# Patient Record
Sex: FEMALE | Race: WHITE | URBAN - METROPOLITAN AREA
[De-identification: names, ages, dates, MRNs, and addresses within clinical notes are randomized per-mention and may not be internally consistent; named-entity substitution may affect disease eponyms.]

---

## 2024-11-14 ENCOUNTER — NURSING HOME VISIT (OUTPATIENT)
Dept: POST ACUTE CARE | Facility: EXTERNAL LOCATION | Age: 54
End: 2024-11-14

## 2024-11-14 DIAGNOSIS — E03.9 HYPOTHYROIDISM, UNSPECIFIED TYPE: ICD-10-CM

## 2024-11-14 DIAGNOSIS — L85.3 DRY SKIN: ICD-10-CM

## 2024-11-14 DIAGNOSIS — J81.1 CHRONIC PULMONARY EDEMA: ICD-10-CM

## 2024-11-14 DIAGNOSIS — I89.0 LYMPHEDEMA: ICD-10-CM

## 2024-11-14 DIAGNOSIS — L03.115 CELLULITIS OF RIGHT LEG: ICD-10-CM

## 2024-11-14 DIAGNOSIS — B37.9 YEAST INFECTION: ICD-10-CM

## 2024-11-14 DIAGNOSIS — R53.81 PHYSICAL DECONDITIONING: Primary | ICD-10-CM

## 2024-11-14 DIAGNOSIS — E55.9 VITAMIN D DEFICIENCY: ICD-10-CM

## 2024-11-14 DIAGNOSIS — D50.8 IRON DEFICIENCY ANEMIA SECONDARY TO INADEQUATE DIETARY IRON INTAKE: ICD-10-CM

## 2024-11-14 NOTE — LETTER
Patient: Jackie Whalen  : 1970    Encounter Date: 2024    Patient ID: Jackie Whalen is a 54 y.o. female who is acute skilled care being seen and evaluated for multiple medical problems.  [id number]   1970    /68   Pulse 68   Temp 36.6 °C (97.9 °F)   Resp 15   Wt (!) 187 kg (412 lb)   SpO2 96%     Assessment/Plan  Problem List Items Addressed This Visit       Yeast infection     Miconazole 2% powder BID as prescribed           Dry skin     Ammonia Lactate 12% BLE BID         Physical deconditioning - Primary     PT/OT         Hypothyroidism     Levothyroxine 50 mcg by mouth daily   2024 TSH 7.805  Repeat TSH in 6 weeks          Lymphedema     PMH         Cellulitis of right leg     Recent admission   Cefepime completed during Hospitalization   2024 BUN/Creat 13/0.4, Potassium 4.5         Vitamin D deficiency     D2 50,000 units by mouth weekly   2024 D- less than 7         Chronic pulmonary edema     1800 ml fluid restriction          Iron deficiency anemia secondary to inadequate dietary iron intake     PMH   2024 H/H 10/33.2              HPI: Client was admitted at [facility] in [place], [place] from 10/28/2024- 2024 with the final diagnosis of pulmonary edema, BLE lymphedema, and RLE cellulitis. She completed Cefepime. She received IV Lasix. Placed on 1800 ml fluid restriction. Scabies treated with Permethrin x 2 (10/29/2024-2024). PMH includes Lymphedema, HTN, HLD, Obesity, GERD, Iron anemia, hypothyroidism, ANIYAH, and Cellulitis. Client denies HA, dizziness, or lightheadedness. Denies CP, SOB, or increased edema. O2 2 lit NC. Denies abdominal pain, N/V, diarrhea, or constipation. Denies dysuria. Denies change in appetite. She denies current pain.       Review of Systems ROS as described in HPI     Physical Exam  Constitutional:       Comments: Sitting in WC   HENT:      Mouth/Throat:      Mouth: Mucous membranes are moist.   Cardiovascular:      Rate and  Rhythm: Normal rate.   Pulmonary:      Effort: Pulmonary effort is normal.      Comments: 2 lit NC  Abdominal:      General: Bowel sounds are normal.   Genitourinary:     Comments: Denies dysuria   Musculoskeletal:      Comments: PT/OT   BLE lymphedema    Skin:     General: Skin is warm and dry.      Comments: BLE- wound care team services    Neurological:      Mental Status: She is alert. Mental status is at baseline.   Psychiatric:         Mood and Affect: Mood normal.                   Electronically Signed By: PATRICIA Robbins   11/16/24  1:38 PM

## 2024-11-16 VITALS
HEART RATE: 68 BPM | RESPIRATION RATE: 15 BRPM | WEIGHT: 293 LBS | SYSTOLIC BLOOD PRESSURE: 126 MMHG | OXYGEN SATURATION: 96 % | DIASTOLIC BLOOD PRESSURE: 68 MMHG | TEMPERATURE: 97.9 F

## 2024-11-16 PROBLEM — D50.8 IRON DEFICIENCY ANEMIA SECONDARY TO INADEQUATE DIETARY IRON INTAKE: Status: ACTIVE | Noted: 2024-11-16

## 2024-11-16 PROBLEM — I89.0 LYMPHEDEMA: Status: ACTIVE | Noted: 2024-11-16

## 2024-11-16 PROBLEM — E03.9 HYPOTHYROIDISM: Status: ACTIVE | Noted: 2024-11-16

## 2024-11-16 PROBLEM — G47.33 OSA (OBSTRUCTIVE SLEEP APNEA): Status: ACTIVE | Noted: 2024-11-16

## 2024-11-16 PROBLEM — E78.49 OTHER HYPERLIPIDEMIA: Status: ACTIVE | Noted: 2024-11-16

## 2024-11-16 PROBLEM — K21.9 GASTROESOPHAGEAL REFLUX DISEASE WITHOUT ESOPHAGITIS: Status: ACTIVE | Noted: 2024-11-16

## 2024-11-16 PROBLEM — B37.9 YEAST INFECTION: Status: ACTIVE | Noted: 2024-11-16

## 2024-11-16 PROBLEM — E55.9 VITAMIN D DEFICIENCY: Status: ACTIVE | Noted: 2024-11-16

## 2024-11-16 PROBLEM — J81.1 CHRONIC PULMONARY EDEMA: Status: ACTIVE | Noted: 2024-11-16

## 2024-11-16 PROBLEM — R53.81 PHYSICAL DECONDITIONING: Status: ACTIVE | Noted: 2024-11-16

## 2024-11-16 PROBLEM — L85.3 DRY SKIN: Status: ACTIVE | Noted: 2024-11-16

## 2024-11-16 PROBLEM — L03.115 CELLULITIS OF RIGHT LEG: Status: ACTIVE | Noted: 2024-11-16

## 2024-11-16 PROBLEM — I10 PRIMARY HYPERTENSION: Status: ACTIVE | Noted: 2024-11-16

## 2024-11-16 NOTE — ASSESSMENT & PLAN NOTE
Recent admission   Cefepime completed during Hospitalization   11/11/2024 BUN/Creat 13/0.4, Potassium 4.5

## 2024-11-16 NOTE — PROGRESS NOTES
Patient ID: Jackie Whalen is a 54 y.o. female who is acute skilled care being seen and evaluated for multiple medical problems.  [id number]   1970    /68   Pulse 68   Temp 36.6 °C (97.9 °F)   Resp 15   Wt (!) 187 kg (412 lb)   SpO2 96%     Assessment/Plan  Problem List Items Addressed This Visit       Yeast infection     Miconazole 2% powder BID as prescribed           Dry skin     Ammonia Lactate 12% BLE BID         Physical deconditioning - Primary     PT/OT         Hypothyroidism     Levothyroxine 50 mcg by mouth daily   11/11/2024 TSH 7.805  Repeat TSH in 6 weeks          Lymphedema     PMH         Cellulitis of right leg     Recent admission   Cefepime completed during Hospitalization   11/11/2024 BUN/Creat 13/0.4, Potassium 4.5         Vitamin D deficiency     D2 50,000 units by mouth weekly   11/11/2024 D- less than 7         Chronic pulmonary edema     1800 ml fluid restriction          Iron deficiency anemia secondary to inadequate dietary iron intake     PMH   11/11/2024 H/H 10/33.2              HPI: Client was admitted at [facility] in [place], [place] from 10/28/2024- 11/8/2024 with the final diagnosis of pulmonary edema, BLE lymphedema, and RLE cellulitis. She completed Cefepime. She received IV Lasix. Placed on 1800 ml fluid restriction. Scabies treated with Permethrin x 2 (10/29/2024-11/5/2024). PMH includes Lymphedema, HTN, HLD, Obesity, GERD, Iron anemia, hypothyroidism, ANIYAH, and Cellulitis. Client denies HA, dizziness, or lightheadedness. Denies CP, SOB, or increased edema. O2 2 lit NC. Denies abdominal pain, N/V, diarrhea, or constipation. Denies dysuria. Denies change in appetite. She denies current pain.       Review of Systems ROS as described in HPI     Physical Exam  Constitutional:       Comments: Sitting in WC   HENT:      Mouth/Throat:      Mouth: Mucous membranes are moist.   Cardiovascular:      Rate and Rhythm: Normal rate.   Pulmonary:      Effort: Pulmonary effort is  normal.      Comments: 2 lit NC  Abdominal:      General: Bowel sounds are normal.   Genitourinary:     Comments: Denies dysuria   Musculoskeletal:      Comments: PT/OT   BLE lymphedema    Skin:     General: Skin is warm and dry.      Comments: BLE- wound care team services    Neurological:      Mental Status: She is alert. Mental status is at baseline.   Psychiatric:         Mood and Affect: Mood normal.

## 2024-11-22 ENCOUNTER — NURSING HOME VISIT (OUTPATIENT)
Dept: POST ACUTE CARE | Facility: EXTERNAL LOCATION | Age: 54
End: 2024-11-22

## 2024-11-22 DIAGNOSIS — E03.8 OTHER SPECIFIED HYPOTHYROIDISM: Primary | ICD-10-CM

## 2024-11-22 DIAGNOSIS — L85.3 DRY SKIN: ICD-10-CM

## 2024-11-22 DIAGNOSIS — E55.9 VITAMIN D DEFICIENCY: ICD-10-CM

## 2024-11-22 DIAGNOSIS — R53.81 PHYSICAL DECONDITIONING: ICD-10-CM

## 2024-11-22 DIAGNOSIS — I89.0 LYMPHEDEMA: ICD-10-CM

## 2024-11-22 DIAGNOSIS — L03.115 CELLULITIS OF RIGHT LEG: ICD-10-CM

## 2024-11-22 PROCEDURE — 99308 SBSQ NF CARE LOW MDM 20: CPT | Performed by: NURSE PRACTITIONER

## 2024-11-22 NOTE — LETTER
Patient: Jackie Whalen  : 1970    Encounter Date: 2024    Patient ID: Jackie Whalen is a 54 y.o. female who is acute skilled care being seen and evaluated for multiple medical problems.  [id number]   1970    /80   Pulse 78   Temp 36.7 °C (98 °F)   Resp 16   Wt (!) 188 kg (414 lb)   SpO2 96%     Assessment/Plan  Problem List Items Addressed This Visit       Dry skin     Ammonia Lactate 12% BLE BID         Physical deconditioning     PT/OT         Hypothyroidism - Primary     Levothyroxine 50 mcg by mouth daily   2024 TSH 7.805  Repeat TSH in 6 weeks - 2024          Lymphedema     PMH- Wound care as prescribed   Receives Wound care team services          Cellulitis of right leg     Recent Hospital admission   Cefepime completed during Hospitalization   2024 BUN/Creat 13/0.4, Potassium 4.5  2024 BUN/Creat 13/0.4, Potassium 4.5, H/H 10/32.2         Vitamin D deficiency     D2 50,000 units by mouth weekly   2024 D- less than 7  Vitamin D level 2025              HPI: Client denies HA, dizziness, or lightheadedness. Denies CP, SOB,  or increased edema. RA. C/O slight cough. Denies abdominal pain, N/V, diarrhea, or constipation. Denies dysuria.  Denies change in appetite. Denies insomnia. Denies current pain.      Review of Systems ROS as described in HPI     Physical Exam  Constitutional:       Comments: Sitting in WC   HENT:      Mouth/Throat:      Mouth: Mucous membranes are moist.   Cardiovascular:      Rate and Rhythm: Normal rate.   Pulmonary:      Effort: Pulmonary effort is normal.      Breath sounds: Normal breath sounds.      Comments: RA  Abdominal:      General: Bowel sounds are normal.   Genitourinary:     Comments: Denies dysuria   Musculoskeletal:      Comments: BLE Lymphedema    Skin:     General: Skin is warm and dry.      Comments: BLE Dressings D/I    Neurological:      Mental Status: She is alert. Mental status is at baseline.    Psychiatric:         Mood and Affect: Mood normal.      Comments: Cooperative with assessment                    Electronically Signed By: PATRICIA Robbins   11/23/24 11:03 AM

## 2024-11-23 VITALS
DIASTOLIC BLOOD PRESSURE: 80 MMHG | HEART RATE: 78 BPM | RESPIRATION RATE: 16 BRPM | OXYGEN SATURATION: 96 % | WEIGHT: 293 LBS | SYSTOLIC BLOOD PRESSURE: 127 MMHG | TEMPERATURE: 98 F

## 2024-11-23 NOTE — PROGRESS NOTES
Patient ID: Jackie Whalen is a 54 y.o. female who is acute skilled care being seen and evaluated for multiple medical problems.  [id number]   1970    /80   Pulse 78   Temp 36.7 °C (98 °F)   Resp 16   Wt (!) 188 kg (414 lb)   SpO2 96%     Assessment/Plan  Problem List Items Addressed This Visit       Dry skin     Ammonia Lactate 12% BLE BID         Physical deconditioning     PT/OT         Hypothyroidism - Primary     Levothyroxine 50 mcg by mouth daily   11/11/2024 TSH 7.805  Repeat TSH in 6 weeks - 12/27/2024          Lymphedema     PMH- Wound care as prescribed   Receives Wound care team services          Cellulitis of right leg     Recent Hospital admission   Cefepime completed during Hospitalization   11/11/2024 BUN/Creat 13/0.4, Potassium 4.5  11/13/2024 BUN/Creat 13/0.4, Potassium 4.5, H/H 10/32.2         Vitamin D deficiency     D2 50,000 units by mouth weekly   11/11/2024 D- less than 7  Vitamin D level 2/14/2025              HPI: Client denies HA, dizziness, or lightheadedness. Denies CP, SOB,  or increased edema. RA. C/O slight cough. Denies abdominal pain, N/V, diarrhea, or constipation. Denies dysuria.  Denies change in appetite. Denies insomnia. Denies current pain.      Review of Systems ROS as described in HPI     Physical Exam  Constitutional:       Comments: Sitting in WC   HENT:      Mouth/Throat:      Mouth: Mucous membranes are moist.   Cardiovascular:      Rate and Rhythm: Normal rate.   Pulmonary:      Effort: Pulmonary effort is normal.      Breath sounds: Normal breath sounds.      Comments: RA  Abdominal:      General: Bowel sounds are normal.   Genitourinary:     Comments: Denies dysuria   Musculoskeletal:      Comments: BLE Lymphedema    Skin:     General: Skin is warm and dry.      Comments: BLE Dressings D/I    Neurological:      Mental Status: She is alert. Mental status is at baseline.   Psychiatric:         Mood and Affect: Mood normal.      Comments: Cooperative  with assessment

## 2024-11-23 NOTE — ASSESSMENT & PLAN NOTE
Recent Hospital admission   Cefepime completed during Hospitalization   11/11/2024 BUN/Creat 13/0.4, Potassium 4.5  11/13/2024 BUN/Creat 13/0.4, Potassium 4.5, H/H 10/32.2